# Patient Record
Sex: FEMALE | Race: BLACK OR AFRICAN AMERICAN | NOT HISPANIC OR LATINO | Employment: FULL TIME | URBAN - METROPOLITAN AREA
[De-identification: names, ages, dates, MRNs, and addresses within clinical notes are randomized per-mention and may not be internally consistent; named-entity substitution may affect disease eponyms.]

---

## 2022-01-11 PROBLEM — G89.29 CHRONIC PAIN OF BOTH KNEES: Chronic | Status: ACTIVE | Noted: 2022-01-11

## 2022-01-11 PROBLEM — I10 HYPERTENSION: Status: ACTIVE | Noted: 2019-06-03

## 2022-01-11 PROBLEM — M17.0 PRIMARY OSTEOARTHRITIS OF BOTH KNEES: Chronic | Status: ACTIVE | Noted: 2022-01-11

## 2022-01-11 PROBLEM — M25.561 CHRONIC PAIN OF BOTH KNEES: Chronic | Status: ACTIVE | Noted: 2022-01-11

## 2022-01-11 PROBLEM — Z20.822 CONTACT WITH AND (SUSPECTED) EXPOSURE TO COVID-19: Status: ACTIVE | Noted: 2021-09-15

## 2022-01-11 PROBLEM — M25.562 CHRONIC PAIN OF BOTH KNEES: Chronic | Status: ACTIVE | Noted: 2022-01-11

## 2022-01-11 PROBLEM — M19.90 ARTHRITIS: Status: ACTIVE | Noted: 2022-01-11

## 2022-01-12 PROBLEM — M19.90 ARTHRITIS: Status: RESOLVED | Noted: 2022-01-11 | Resolved: 2022-01-12

## 2022-01-12 PROBLEM — I10 HYPERTENSION, ESSENTIAL: Chronic | Status: ACTIVE | Noted: 2019-06-03

## 2022-01-12 PROBLEM — E66.01 OBESITY, CLASS III, BMI 40-49.9 (MORBID OBESITY): Chronic | Status: ACTIVE | Noted: 2022-01-12

## 2022-01-12 PROBLEM — Z20.822 CONTACT WITH AND (SUSPECTED) EXPOSURE TO COVID-19: Status: RESOLVED | Noted: 2021-09-15 | Resolved: 2022-01-12

## 2022-04-07 PROBLEM — R20.2 NUMBNESS AND TINGLING OF RIGHT ARM: Status: ACTIVE | Noted: 2022-04-07

## 2022-04-07 PROBLEM — F41.9 ANXIETY: Status: ACTIVE | Noted: 2022-04-07

## 2022-04-07 PROBLEM — R20.0 NUMBNESS AND TINGLING OF RIGHT ARM: Status: ACTIVE | Noted: 2022-04-07

## 2023-08-02 ENCOUNTER — HOSPITAL ENCOUNTER (EMERGENCY)
Facility: HOSPITAL | Age: 48
Discharge: HOME | End: 2023-08-02
Attending: EMERGENCY MEDICINE
Payer: COMMERCIAL

## 2023-08-02 VITALS
OXYGEN SATURATION: 98 % | HEART RATE: 62 BPM | BODY MASS INDEX: 47.56 KG/M2 | DIASTOLIC BLOOD PRESSURE: 67 MMHG | RESPIRATION RATE: 18 BRPM | WEIGHT: 293 LBS | SYSTOLIC BLOOD PRESSURE: 136 MMHG | TEMPERATURE: 98 F

## 2023-08-02 DIAGNOSIS — R10.9 FLANK PAIN: Primary | ICD-10-CM

## 2023-08-02 LAB
ALBUMIN SERPL BCP-MCNC: 3.3 G/DL (ref 3.5–5.2)
ALP SERPL-CCNC: 102 U/L (ref 55–135)
ALT SERPL W/O P-5'-P-CCNC: 13 U/L (ref 10–44)
ANION GAP SERPL CALC-SCNC: 9 MMOL/L (ref 8–16)
AST SERPL-CCNC: 13 U/L (ref 10–40)
B-HCG UR QL: NEGATIVE
BASOPHILS # BLD AUTO: 0.02 K/UL (ref 0–0.2)
BASOPHILS NFR BLD: 0.3 % (ref 0–1.9)
BILIRUB SERPL-MCNC: 0.2 MG/DL (ref 0.1–1)
BILIRUB UR QL STRIP: NEGATIVE
BUN SERPL-MCNC: 14 MG/DL (ref 6–20)
CALCIUM SERPL-MCNC: 8.7 MG/DL (ref 8.7–10.5)
CHLORIDE SERPL-SCNC: 106 MMOL/L (ref 95–110)
CLARITY UR: CLEAR
CO2 SERPL-SCNC: 25 MMOL/L (ref 23–29)
COLOR UR: YELLOW
CREAT SERPL-MCNC: 0.7 MG/DL (ref 0.5–1.4)
DIFFERENTIAL METHOD: ABNORMAL
EOSINOPHIL # BLD AUTO: 0.1 K/UL (ref 0–0.5)
EOSINOPHIL NFR BLD: 1.4 % (ref 0–8)
ERYTHROCYTE [DISTWIDTH] IN BLOOD BY AUTOMATED COUNT: 15.9 % (ref 11.5–14.5)
EST. GFR  (NO RACE VARIABLE): >60 ML/MIN/1.73 M^2
GLUCOSE SERPL-MCNC: 110 MG/DL (ref 70–110)
GLUCOSE UR QL STRIP: NEGATIVE
HCT VFR BLD AUTO: 28.6 % (ref 37–48.5)
HGB BLD-MCNC: 8.9 G/DL (ref 12–16)
HGB UR QL STRIP: ABNORMAL
IMM GRANULOCYTES # BLD AUTO: 0.04 K/UL (ref 0–0.04)
IMM GRANULOCYTES NFR BLD AUTO: 0.6 % (ref 0–0.5)
KETONES UR QL STRIP: NEGATIVE
LEUKOCYTE ESTERASE UR QL STRIP: NEGATIVE
LIPASE SERPL-CCNC: 30 U/L (ref 4–60)
LYMPHOCYTES # BLD AUTO: 2.3 K/UL (ref 1–4.8)
LYMPHOCYTES NFR BLD: 35.5 % (ref 18–48)
MCH RBC QN AUTO: 26.5 PG (ref 27–31)
MCHC RBC AUTO-ENTMCNC: 31.1 G/DL (ref 32–36)
MCV RBC AUTO: 85 FL (ref 82–98)
MONOCYTES # BLD AUTO: 0.5 K/UL (ref 0.3–1)
MONOCYTES NFR BLD: 7.4 % (ref 4–15)
NEUTROPHILS # BLD AUTO: 3.6 K/UL (ref 1.8–7.7)
NEUTROPHILS NFR BLD: 54.8 % (ref 38–73)
NITRITE UR QL STRIP: NEGATIVE
NRBC BLD-RTO: 0 /100 WBC
PH UR STRIP: 6 [PH] (ref 5–8)
PLATELET # BLD AUTO: 290 K/UL (ref 150–450)
PMV BLD AUTO: 9.6 FL (ref 9.2–12.9)
POTASSIUM SERPL-SCNC: 3.7 MMOL/L (ref 3.5–5.1)
PROT SERPL-MCNC: 7.3 G/DL (ref 6–8.4)
PROT UR QL STRIP: NEGATIVE
RBC # BLD AUTO: 3.36 M/UL (ref 4–5.4)
SODIUM SERPL-SCNC: 140 MMOL/L (ref 136–145)
SP GR UR STRIP: 1.02 (ref 1–1.03)
URN SPEC COLLECT METH UR: ABNORMAL
UROBILINOGEN UR STRIP-ACNC: NEGATIVE EU/DL
WBC # BLD AUTO: 6.59 K/UL (ref 3.9–12.7)

## 2023-08-02 PROCEDURE — 83690 ASSAY OF LIPASE: CPT | Performed by: EMERGENCY MEDICINE

## 2023-08-02 PROCEDURE — 80053 COMPREHEN METABOLIC PANEL: CPT | Performed by: EMERGENCY MEDICINE

## 2023-08-02 PROCEDURE — 25000003 PHARM REV CODE 250: Performed by: EMERGENCY MEDICINE

## 2023-08-02 PROCEDURE — 81003 URINALYSIS AUTO W/O SCOPE: CPT | Performed by: EMERGENCY MEDICINE

## 2023-08-02 PROCEDURE — 99285 EMERGENCY DEPT VISIT HI MDM: CPT | Mod: 25

## 2023-08-02 PROCEDURE — 96375 TX/PRO/DX INJ NEW DRUG ADDON: CPT

## 2023-08-02 PROCEDURE — 96376 TX/PRO/DX INJ SAME DRUG ADON: CPT

## 2023-08-02 PROCEDURE — 63600175 PHARM REV CODE 636 W HCPCS: Performed by: EMERGENCY MEDICINE

## 2023-08-02 PROCEDURE — 81025 URINE PREGNANCY TEST: CPT | Performed by: EMERGENCY MEDICINE

## 2023-08-02 PROCEDURE — 96361 HYDRATE IV INFUSION ADD-ON: CPT

## 2023-08-02 PROCEDURE — 96374 THER/PROPH/DIAG INJ IV PUSH: CPT

## 2023-08-02 PROCEDURE — 85025 COMPLETE CBC W/AUTO DIFF WBC: CPT | Performed by: EMERGENCY MEDICINE

## 2023-08-02 RX ORDER — NAPROXEN 375 MG/1
375 TABLET ORAL 2 TIMES DAILY WITH MEALS
Qty: 30 TABLET | Refills: 0 | Status: SHIPPED | OUTPATIENT
Start: 2023-08-02 | End: 2023-08-02 | Stop reason: SDUPTHER

## 2023-08-02 RX ORDER — ONDANSETRON 4 MG/1
4 TABLET, ORALLY DISINTEGRATING ORAL EVERY 6 HOURS PRN
Qty: 15 TABLET | Refills: 0 | Status: SHIPPED | OUTPATIENT
Start: 2023-08-02 | End: 2023-08-02 | Stop reason: SDUPTHER

## 2023-08-02 RX ORDER — NAPROXEN 375 MG/1
375 TABLET ORAL 2 TIMES DAILY WITH MEALS
Qty: 30 TABLET | Refills: 0 | Status: SHIPPED | OUTPATIENT
Start: 2023-08-02

## 2023-08-02 RX ORDER — MORPHINE SULFATE 4 MG/ML
4 INJECTION, SOLUTION INTRAMUSCULAR; INTRAVENOUS
Status: COMPLETED | OUTPATIENT
Start: 2023-08-02 | End: 2023-08-02

## 2023-08-02 RX ORDER — ONDANSETRON 4 MG/1
4 TABLET, ORALLY DISINTEGRATING ORAL EVERY 6 HOURS PRN
Qty: 15 TABLET | Refills: 0 | Status: SHIPPED | OUTPATIENT
Start: 2023-08-02

## 2023-08-02 RX ORDER — TRAMADOL HYDROCHLORIDE 50 MG/1
50 TABLET ORAL EVERY 6 HOURS PRN
Qty: 10 TABLET | Refills: 0 | Status: SHIPPED | OUTPATIENT
Start: 2023-08-02 | End: 2023-08-07

## 2023-08-02 RX ORDER — KETOROLAC TROMETHAMINE 30 MG/ML
15 INJECTION, SOLUTION INTRAMUSCULAR; INTRAVENOUS
Status: COMPLETED | OUTPATIENT
Start: 2023-08-02 | End: 2023-08-02

## 2023-08-02 RX ORDER — TRAMADOL HYDROCHLORIDE 50 MG/1
50 TABLET ORAL EVERY 6 HOURS PRN
Qty: 10 TABLET | Refills: 0 | Status: SHIPPED | OUTPATIENT
Start: 2023-08-02 | End: 2023-08-02 | Stop reason: SDUPTHER

## 2023-08-02 RX ORDER — ONDANSETRON 2 MG/ML
4 INJECTION INTRAMUSCULAR; INTRAVENOUS
Status: COMPLETED | OUTPATIENT
Start: 2023-08-02 | End: 2023-08-02

## 2023-08-02 RX ADMIN — SODIUM CHLORIDE 1000 ML: 9 INJECTION, SOLUTION INTRAVENOUS at 07:08

## 2023-08-02 RX ADMIN — MORPHINE SULFATE 4 MG: 4 INJECTION INTRAVENOUS at 07:08

## 2023-08-02 RX ADMIN — ONDANSETRON 4 MG: 2 INJECTION INTRAMUSCULAR; INTRAVENOUS at 07:08

## 2023-08-02 RX ADMIN — MORPHINE SULFATE 4 MG: 4 INJECTION INTRAVENOUS at 08:08

## 2023-08-02 RX ADMIN — KETOROLAC TROMETHAMINE 15 MG: 30 INJECTION, SOLUTION INTRAMUSCULAR; INTRAVENOUS at 10:08

## 2023-08-02 NOTE — Clinical Note
"Marcelina Matabrad" Brii was seen and treated in our emergency department on 8/2/2023.  She may return to work on 08/05/2023.       If you have any questions or concerns, please don't hesitate to call.      Chrissy Ortiz RN    "

## 2023-08-02 NOTE — ED PROVIDER NOTES
SCRIBE #1 NOTE: I, Nani Murphy, am scribing for, and in the presence of, Douglas Whitlock MD. I have scribed the entire note.       History     Chief Complaint   Patient presents with    Abdominal Pain     Bilat abd pain radiating to R side. Reports pains began after bowel movement. Also c/o nausea     Review of patient's allergies indicates:   Allergen Reactions    Citric acid Shortness Of Breath and Swelling    Iodine and iodide containing products Anaphylaxis and Swelling    Tomato (solanum lycopersicum) Shortness Of Breath and Swelling         History of Present Illness     HPI    8/2/2023, 7:04 AM  History obtained from the patient      History of Present Illness: Marcelina Teresa is a 47 y.o. female patient with a PMHx of HTN and obesity who presents to the Emergency Department for evaluation of RLQ abd pain which onset this AM. Symptoms are constant and moderate in severity. No mitigating or exacerbating factors reported. Associated sxs include n/v. Patient denies any dysuria, flank pain, diarrhea, blood in stool, constipation, and all other sxs at this time. No further complaints or concerns at this time.       Arrival mode: Personal vehicle      PCP: Primary Doctor No        Past Medical History:  Past Medical History:   Diagnosis Date    Allergic rhinitis 4/5/2013    Arthritis 1/11/2022    Backache 4/5/2013    Formatting of this note might be different from the original. dx update dx update    Contact with and (suspected) exposure to covid-19 9/15/2021    Dysuria 4/5/2013    Encounter for screening 4/5/2013    Formatting of this note might be different from the original. dx update dx update    Hypertension     Hypertension, essential 6/3/2019    Lower urinary tract infectious disease 4/5/2013    Formatting of this note might be different from the original. dx update dx update    Obesity, Class III, BMI 48.55 (morbid obesity) 1/12/2022    RT 2017 > LT 2021 Knee Osteoarthritis 01/11/2022    RT 2017  > LT 2021 Knee Pain 1/11/2022       Past Surgical History:  Past Surgical History:   Procedure Laterality Date    KIDNEY STONE SURGERY  2019    [facility] [place], [place]    TUBAL LIGATION Bilateral 2007    [facility]         Family History:  Family History   Problem Relation Age of Onset    Hypertension Mother     Kidney failure Father     Hypertension Father     Diabetes Father        Social History:  Social History     Tobacco Use    Smoking status: Never    Smokeless tobacco: Never   Substance and Sexual Activity    Alcohol use: Not Currently     Comment: Occasional mixed drink.    Drug use: Never    Sexual activity: Not Currently     Partners: Male        Review of Systems     Review of Systems   Constitutional:  Negative for fever.   HENT:  Negative for sore throat.    Respiratory:  Negative for shortness of breath.    Cardiovascular:  Negative for chest pain.   Gastrointestinal:  Positive for abdominal pain (RLQ), nausea and vomiting. Negative for blood in stool, constipation and diarrhea.   Genitourinary:  Negative for dysuria and flank pain.   Musculoskeletal:  Negative for back pain.   Skin:  Negative for rash.   Neurological:  Negative for weakness.   Hematological:  Does not bruise/bleed easily.   All other systems reviewed and are negative.       Physical Exam     Initial Vitals [08/02/23 0658]   BP Pulse Resp Temp SpO2   127/73 78 (!) 22 97.8 °F (36.6 °C) 98 %      MAP       --          Physical Exam  Nursing Notes and Vital Signs Reviewed.  Constitutional: Patient is in mild distress. Well-developed and well-nourished.  Head: Atraumatic. Normocephalic.  Eyes: PERRL. EOM intact. Conjunctivae are not pale. No scleral icterus.  ENT: Mucous membranes are moist. Oropharynx is clear and symmetric.    Neck: Supple. Full ROM. No lymphadenopathy.  Cardiovascular: Regular rate. Regular rhythm. No murmurs, rubs, or gallops. Distal pulses are 2+ and  symmetric.  Pulmonary/Chest: No respiratory distress. Clear to auscultation bilaterally. No wheezing or rales.  Abdominal: Soft and non-distended.  There is RLQ tenderness.  No rebound, guarding, or rigidity. Good bowel sounds.  Genitourinary: No CVA tenderness  Musculoskeletal: Moves all extremities. No obvious deformities. No edema. No calf tenderness.  Skin: Warm and dry.  Neurological:  Alert, awake, and appropriate.  Normal speech.  No acute focal neurological deficits are appreciated.  Psychiatric: Normal affect. Good eye contact. Appropriate in content.     ED Course   Procedures  ED Vital Signs:  Vitals:    08/02/23 0658 08/02/23 0733 08/02/23 0834 08/02/23 0835   BP: 127/73   139/70   Pulse: 78   60   Resp: (!) 22 20 18    Temp: 97.8 °F (36.6 °C)      TempSrc: Oral      SpO2: 98%   100%   Weight: (!) 137.8 kg (303 lb 10.9 oz)       08/02/23 0847   BP: (!) 143/75   Pulse: 61   Resp:    Temp:    TempSrc:    SpO2: 98%   Weight:        Abnormal Lab Results:  Labs Reviewed   CBC W/ AUTO DIFFERENTIAL - Abnormal; Notable for the following components:       Result Value    RBC 3.36 (*)     Hemoglobin 8.9 (*)     Hematocrit 28.6 (*)     MCH 26.5 (*)     MCHC 31.1 (*)     RDW 15.9 (*)     Immature Granulocytes 0.6 (*)     All other components within normal limits   COMPREHENSIVE METABOLIC PANEL - Abnormal; Notable for the following components:    Albumin 3.3 (*)     All other components within normal limits   URINALYSIS, REFLEX TO URINE CULTURE - Abnormal; Notable for the following components:    Occult Blood UA Trace (*)     All other components within normal limits    Narrative:     Specimen Source->Urine   PREGNANCY TEST, URINE RAPID   LIPASE        All Lab Results:  Results for orders placed or performed during the hospital encounter of 08/02/23   CBC Auto Differential   Result Value Ref Range    WBC 6.59 3.90 - 12.70 K/uL    RBC 3.36 (L) 4.00 - 5.40 M/uL    Hemoglobin 8.9 (L) 12.0 - 16.0 g/dL    Hematocrit 28.6  (L) 37.0 - 48.5 %    MCV 85 82 - 98 fL    MCH 26.5 (L) 27.0 - 31.0 pg    MCHC 31.1 (L) 32.0 - 36.0 g/dL    RDW 15.9 (H) 11.5 - 14.5 %    Platelets 290 150 - 450 K/uL    MPV 9.6 9.2 - 12.9 fL    Immature Granulocytes 0.6 (H) 0.0 - 0.5 %    Gran # (ANC) 3.6 1.8 - 7.7 K/uL    Immature Grans (Abs) 0.04 0.00 - 0.04 K/uL    Lymph # 2.3 1.0 - 4.8 K/uL    Mono # 0.5 0.3 - 1.0 K/uL    Eos # 0.1 0.0 - 0.5 K/uL    Baso # 0.02 0.00 - 0.20 K/uL    nRBC 0 0 /100 WBC    Gran % 54.8 38.0 - 73.0 %    Lymph % 35.5 18.0 - 48.0 %    Mono % 7.4 4.0 - 15.0 %    Eosinophil % 1.4 0.0 - 8.0 %    Basophil % 0.3 0.0 - 1.9 %    Differential Method Automated    Comprehensive Metabolic Panel   Result Value Ref Range    Sodium 140 136 - 145 mmol/L    Potassium 3.7 3.5 - 5.1 mmol/L    Chloride 106 95 - 110 mmol/L    CO2 25 23 - 29 mmol/L    Glucose 110 70 - 110 mg/dL    BUN 14 6 - 20 mg/dL    Creatinine 0.7 0.5 - 1.4 mg/dL    Calcium 8.7 8.7 - 10.5 mg/dL    Total Protein 7.3 6.0 - 8.4 g/dL    Albumin 3.3 (L) 3.5 - 5.2 g/dL    Total Bilirubin 0.2 0.1 - 1.0 mg/dL    Alkaline Phosphatase 102 55 - 135 U/L    AST 13 10 - 40 U/L    ALT 13 10 - 44 U/L    eGFR >60 >60 mL/min/1.73 m^2    Anion Gap 9 8 - 16 mmol/L   Urinalysis, Reflex to Urine Culture Urine, Clean Catch    Specimen: Urine   Result Value Ref Range    Specimen UA Urine, Clean Catch     Color, UA Yellow Yellow, Straw, Criss    Appearance, UA Clear Clear    pH, UA 6.0 5.0 - 8.0    Specific Gravity, UA 1.020 1.005 - 1.030    Protein, UA Negative Negative    Glucose, UA Negative Negative    Ketones, UA Negative Negative    Bilirubin (UA) Negative Negative    Occult Blood UA Trace (A) Negative    Nitrite, UA Negative Negative    Urobilinogen, UA Negative <2.0 EU/dL    Leukocytes, UA Negative Negative   Pregnancy, urine rapid   Result Value Ref Range    Preg Test, Ur Negative    Lipase   Result Value Ref Range    Lipase 30 4 - 60 U/L         Imaging Results:  Imaging Results              US Pelvis  Comp with Transvag NON-OB (xpd) (Final result)  Result time 08/02/23 10:52:48   Procedure changed from US Pelvis Complete Non OB     Final result by Burke Crawford MD (08/02/23 10:52:48)                   Impression:      The right ovary is not visualized.  The uterus and left ovary have a normal sonographic appearance.      Electronically signed by: Burke Crawford MD  Date:    08/02/2023  Time:    10:52               Narrative:    EXAMINATION:  US PELVIS COMP WITH TRANSVAG NON-OB (XPD)    CLINICAL HISTORY:  pelvic pain;    FINDINGS:  The uterus is normal in size and echotexture with a normal 9 mm endometrial stripe.  The uterus is retroflexed.  The right ovary was not visualized and the left ovary measures 3.9 x 1.8 x 2.8 cm. No free fluid identified. No solid adnexal lesions identified. Internal flow is demonstrated within both ovaries on color doppler.                                       CT Renal Stone Study ABD Pelvis WO (Final result)  Result time 08/02/23 09:37:31      Final result by Eric Cui III, MD (08/02/23 09:37:31)                   Impression:      Mild right-sided pelvicaliectasis.  No visualized obstructing calculus.    Diverticulosis without CT evidence of diverticulitis.    Mild free fluid within the posterior cul-de-sac which is slightly hyperdense suggesting possible hemorrhagic products.  If clinically indicated, consider with follow-up with pelvic/transvaginal ultrasound.      Electronically signed by: Ty Cui  Date:    08/02/2023  Time:    09:37               Narrative:    EXAMINATION:  CT RENAL STONE STUDY ABD PELVIS WO    CLINICAL HISTORY:  Flank pain, kidney stone suspected;    TECHNIQUE:  Low dose axial images, sagittal and coronal reformations were obtained from the lung bases to the pubic symphysis.  Contrast was not administered.    COMPARISON:  CT abdomen and pelvis 02/11/2019    FINDINGS:  Mild right-sided pelvicaliectasis.  No obstructive renal calculus.   Unchanged phleboliths within the pelvis.  The bladder is unremarkable.  The adrenal glands and left kidney appear unremarkable.    The liver, spleen, pancreas and gallbladder are normal.  No aortic aneurysm, free air or adenopathy.  The appendix is normal.  No bowel obstruction.  Diverticulosis within the transverse, descending and sigmoid colon without CT evidence of diverticulitis.    Physiologic free fluid within the pelvis.  The fluid is slightly hyperdense suggesting hemorrhagic products.  The uterus is retroverted.  The ovaries are grossly unremarkable.    Mild scarring or atelectasis within the right lower lobe.                                              The Emergency Provider reviewed the vital signs and test results, which are outlined above.     ED Discussion     10:59 AM: Reassessed pt at this time. Discussed with pt all pertinent ED information and results. Discussed pt dx and plan of tx. Gave pt all f/u and return to the ED instructions. All questions and concerns were addressed at this time. Pt expresses understanding of information and instructions, and is comfortable with plan to discharge. Pt is stable for discharge.    I discussed with patient and/or family/caretaker that evaluation in the ED does not suggest any emergent or life threatening medical conditions requiring immediate intervention beyond what was provided in the ED, and I believe patient is safe for discharge.  Regardless, an unremarkable evaluation in the ED does not preclude the development or presence of a serious of life threatening condition. As such, patient was instructed to return immediately for any worsening or change in current symptoms.       Medical Decision Making:   Initial Assessment:   Right flank pain since las night  Differential Diagnosis:   Kidney stone, flank pain. Ovarian cyst  Clinical Tests:   Lab Tests: Ordered and Reviewed  Radiological Study: Ordered and Reviewed  ED Management:  Labs and imaging reviewed  by me.  No acute findings except for signs of a recently passed stone.  Considered admission, but patient is feeling better, and ready to go home.             ED Medication(s):  Medications   sodium chloride 0.9% bolus 1,000 mL 1,000 mL (0 mLs Intravenous Stopped 8/2/23 0830)   morphine injection 4 mg (4 mg Intravenous Given 8/2/23 0733)   ondansetron injection 4 mg (4 mg Intravenous Given 8/2/23 0732)   morphine injection 4 mg (4 mg Intravenous Given 8/2/23 0834)   ketorolac injection 15 mg (15 mg Intravenous Given 8/2/23 1029)       New Prescriptions    NAPROXEN (NAPROSYN) 375 MG TABLET    Take 1 tablet (375 mg total) by mouth 2 (two) times daily with meals.    ONDANSETRON (ZOFRAN-ODT) 4 MG TBDL    Take 1 tablet (4 mg total) by mouth every 6 (six) hours as needed.    TRAMADOL (ULTRAM) 50 MG TABLET    Take 1 tablet (50 mg total) by mouth every 6 (six) hours as needed for Pain.        Follow-up Information       [facility] In 2 days.    Contact information:  [address] [facility] [address] [phone]                                 Scribe Attestation:   Scribe #1: I performed the above scribed service and the documentation accurately describes the services I performed. I attest to the accuracy of the note.     Attending:   Physician Attestation Statement for Scribe #1: I, Douglas Whitlock MD, personally performed the services described in this documentation, as scribed by Nani Murphy, in my presence, and it is both accurate and complete.           Clinical Impression       ICD-10-CM ICD-9-CM   1. Flank pain  R10.9 789.09       Disposition:   Disposition: Discharged  Condition: Stable         Douglas Whitlock MD  08/02/23 9260

## 2024-11-22 ENCOUNTER — HOSPITAL ENCOUNTER (EMERGENCY)
Facility: HOSPITAL | Age: 49
Discharge: HOME | End: 2024-11-22
Payer: COMMERCIAL

## 2024-11-22 VITALS
WEIGHT: 293 LBS | HEART RATE: 70 BPM | SYSTOLIC BLOOD PRESSURE: 175 MMHG | BODY MASS INDEX: 44.41 KG/M2 | OXYGEN SATURATION: 99 % | RESPIRATION RATE: 16 BRPM | DIASTOLIC BLOOD PRESSURE: 92 MMHG | TEMPERATURE: 98 F | HEIGHT: 68 IN

## 2024-11-22 DIAGNOSIS — S61.412A LACERATION OF LEFT HAND WITHOUT FOREIGN BODY, INITIAL ENCOUNTER: Primary | ICD-10-CM

## 2024-11-22 DIAGNOSIS — S60.222A HEMATOMA OF LEFT HAND: ICD-10-CM

## 2024-11-22 PROCEDURE — 90471 IMMUNIZATION ADMIN: CPT | Performed by: NURSE PRACTITIONER

## 2024-11-22 PROCEDURE — 63600175 PHARM REV CODE 636 W HCPCS: Performed by: NURSE PRACTITIONER

## 2024-11-22 PROCEDURE — 25000003 PHARM REV CODE 250: Performed by: NURSE PRACTITIONER

## 2024-11-22 PROCEDURE — 12002 RPR S/N/AX/GEN/TRNK2.6-7.5CM: CPT

## 2024-11-22 PROCEDURE — 99284 EMERGENCY DEPT VISIT MOD MDM: CPT | Mod: 25

## 2024-11-22 PROCEDURE — 90715 TDAP VACCINE 7 YRS/> IM: CPT | Performed by: NURSE PRACTITIONER

## 2024-11-22 RX ORDER — LIDOCAINE HYDROCHLORIDE 10 MG/ML
10 INJECTION, SOLUTION EPIDURAL; INFILTRATION; INTRACAUDAL; PERINEURAL
Status: COMPLETED | OUTPATIENT
Start: 2024-11-22 | End: 2024-11-22

## 2024-11-22 RX ORDER — DICLOFENAC SODIUM 75 MG/1
75 TABLET, DELAYED RELEASE ORAL 2 TIMES DAILY PRN
Qty: 14 TABLET | Refills: 0 | Status: SHIPPED | OUTPATIENT
Start: 2024-11-22 | End: 2024-11-29

## 2024-11-22 RX ORDER — HYDROCODONE BITARTRATE AND ACETAMINOPHEN 5; 325 MG/1; MG/1
1 TABLET ORAL
Status: COMPLETED | OUTPATIENT
Start: 2024-11-22 | End: 2024-11-22

## 2024-11-22 RX ORDER — ONDANSETRON 4 MG/1
4 TABLET, ORALLY DISINTEGRATING ORAL
Status: COMPLETED | OUTPATIENT
Start: 2024-11-22 | End: 2024-11-22

## 2024-11-22 RX ADMIN — ONDANSETRON 4 MG: 4 TABLET, ORALLY DISINTEGRATING ORAL at 07:11

## 2024-11-22 RX ADMIN — LIDOCAINE HYDROCHLORIDE 100 MG: 10 INJECTION, SOLUTION EPIDURAL; INFILTRATION; INTRACAUDAL at 06:11

## 2024-11-22 RX ADMIN — HYDROCODONE BITARTRATE AND ACETAMINOPHEN 1 TABLET: 5; 325 TABLET ORAL at 07:11

## 2024-11-22 RX ADMIN — TETANUS TOXOID, REDUCED DIPHTHERIA TOXOID AND ACELLULAR PERTUSSIS VACCINE, ADSORBED 0.5 ML: 5; 2.5; 8; 8; 2.5 SUSPENSION INTRAMUSCULAR at 06:11

## 2024-11-22 NOTE — ED PROVIDER NOTES
Encounter Date: 11/22/2024       History     Chief Complaint   Patient presents with    Hand Injury     Pt. Presents to ED due to accidentally stabbing her left hand with a knife while getting candle wax out of a container.      49-year-old female who presents to ER complaining of laceration to left hand from kitchen knife.  Injury occurred just prior to arrival.  Reports tetanus immunization is unknown.  Denies numbness, weakness, decreased range of motion.  Denies any other injuries.  Bleeding controlled with pressure.        Review of patient's allergies indicates:   Allergen Reactions    Citric acid Shortness Of Breath and Swelling    Iodine and iodide containing products Anaphylaxis and Swelling    Tomato (solanum lycopersicum) Shortness Of Breath and Swelling     Past Medical History:   Diagnosis Date    Allergic rhinitis 4/5/2013    Arthritis 1/11/2022    Backache 4/5/2013    Formatting of this note might be different from the original. dx update dx update    Contact with and (suspected) exposure to covid-19 9/15/2021    Dysuria 4/5/2013    Encounter for screening 4/5/2013    Formatting of this note might be different from the original. dx update dx update    Hypertension     Hypertension, essential 6/3/2019    Lower urinary tract infectious disease 4/5/2013    Formatting of this note might be different from the original. dx update dx update    Obesity, Class III, BMI 48.55 (morbid obesity) 1/12/2022    RT 2017 > LT 2021 Knee Osteoarthritis 01/11/2022    RT 2017 > LT 2021 Knee Pain 1/11/2022     Past Surgical History:   Procedure Laterality Date    KIDNEY STONE SURGERY  2019    [facility] [place], [place]    TUBAL LIGATION Bilateral 2007    [facility] [place], [place]     Family History   Problem Relation Name Age of Onset    Hypertension Mother      Kidney failure Father      Hypertension Father      Diabetes Father       Social History     Tobacco Use    Smoking status: Never     Smokeless tobacco: Never   Substance Use Topics    Alcohol use: Not Currently     Comment: Occasional mixed drink.    Drug use: Never     Review of Systems   Constitutional:  Negative for fever.   HENT:  Negative for sore throat.    Respiratory:  Negative for shortness of breath.    Cardiovascular:  Negative for chest pain.   Gastrointestinal:  Negative for nausea.   Genitourinary:  Negative for dysuria.   Musculoskeletal:  Negative for back pain.   Skin:  Negative for rash.        Positive laceration   Neurological:  Negative for weakness.   Hematological:  Does not bruise/bleed easily.       Physical Exam     Initial Vitals [11/22/24 1739]   BP Pulse Resp Temp SpO2   (!) 168/78 71 18 98.1 °F (36.7 °C) 97 %      MAP       --         Physical Exam    Nursing note and vitals reviewed.  Constitutional: She appears well-developed and well-nourished.   HENT:   Head: Normocephalic.   Eyes: Conjunctivae are normal.   Neck: Neck supple.   Cardiovascular:  Normal rate and regular rhythm.           Pulmonary/Chest: Breath sounds normal. No respiratory distress.   Abdominal: She exhibits no distension.   Musculoskeletal:         General: Normal range of motion.      Right hand: Normal.      Left hand: Swelling, laceration and tenderness present. Normal range of motion. Normal strength. Normal sensation. Normal capillary refill. Normal pulse.      Cervical back: Neck supple.      Comments: 2 cm laceration noted to dorsal aspect of left hand.  Bleeding controlled with pressure.  Hematoma noted.  Neurovascular exam intact.     Neurological: She is alert and oriented to person, place, and time.   Skin: Skin is warm and dry.   Psychiatric: She has a normal mood and affect.         ED Course   Lac Repair    Date/Time: 11/22/2024 7:06 PM    Performed by: Yulissa Rodriguez NP  Authorized by: Yulissa Rodriguez NP    Consent:     Consent obtained:  Verbal    Consent given by:  Patient    Risks discussed:  Infection, poor cosmetic  result and tendon damage    Alternatives discussed:  No treatment  Universal protocol:     Procedure explained and questions answered to patient or proxy's satisfaction: yes      Patient identity confirmed:  Verbally with patient  Anesthesia:     Anesthesia method:  Local infiltration    Local anesthetic:  Lidocaine 1% w/o epi  Laceration details:     Location:  Hand    Hand location:  L hand, dorsum    Length (cm):  3  Pre-procedure details:     Preparation:  Patient was prepped and draped in usual sterile fashion and imaging obtained to evaluate for foreign bodies  Exploration:     Hemostasis achieved with:  Direct pressure    Imaging obtained: x-ray      Imaging outcome: foreign body not noted      Wound extent: no foreign bodies/material noted and no tendon damage noted      Contaminated: no    Treatment:     Area cleansed with:  Chlorhexidine    Amount of cleaning:  Standard  Skin repair:     Repair method:  Sutures    Suture size:  4-0    Suture material:  Nylon    Suture technique:  Simple interrupted and figure eight  Approximation:     Approximation:  Close  Repair type:     Repair type:  Intermediate  Post-procedure details:     Dressing:  Bulky dressing    Procedure completion:  Tolerated well, no immediate complications    Labs Reviewed   HEPATITIS C ANTIBODY   HEP C VIRUS HOLD SPECIMEN          Imaging Results              X-Ray Hand 3 view Left (Final result)  Result time 11/22/24 18:39:07      Final result by Mikhail Childress MD (Timothy) (11/22/24 18:39:07)                   Impression:      Negative exam.      Electronically signed by: Mikhail Childress MD  Date:    11/22/2024  Time:    18:39               Narrative:    EXAMINATION:  XR HAND COMPLETE 3 VIEW LEFT    CLINICAL HISTORY:  Left hand pain and injury    TECHNIQUE:  Standard radiography performed.  Three views.    COMPARISON:  None    FINDINGS:  Bone density and architecture are normal.  No acute findings.                                        Medications   LIDOcaine (PF) 10 mg/ml (1%) injection 100 mg (100 mg Infiltration Given 11/22/24 1850)   Tdap (BOOSTRIX) vaccine injection 0.5 mL (0.5 mLs Intramuscular Given 11/22/24 1835)   HYDROcodone-acetaminophen 5-325 mg per tablet 1 tablet (1 tablet Oral Given 11/22/24 1939)   ondansetron disintegrating tablet 4 mg (4 mg Oral Given 11/22/24 1939)     Medical Decision Making                 7:31 PM  Patient comfortable.  Afebrile.  Nontoxic appearing.  Vital signs stable.  Presented to ER for laceration to left hand.  Laceration repaired and hemostasis achieved.  Patient tolerated well.  X-ray of hand without acute findings.  All results discussed with patient.  Advised to follow up with primary care provider or urgent care in 7 days for suture removal.  Advised to return to ER for new or worsening symptoms.  Patient verbalizes understanding and is in agreement with treatment plan.  Stable for discharge.  Differential diagnosis include fracture, retained foreign body, tendon damage, compartment syndrome.                   Clinical Impression:  Final diagnoses:  [S61.412A] Laceration of left hand without foreign body, initial encounter (Primary)  [S60.222A] Hematoma of left hand                 Yulissa Rodriguez NP  11/22/24 1945       Yulissa Rodriguez NP  11/22/24 1945       Yulissa Rodriguez NP  11/22/24 1946       Yulissa Rodriguez NP  11/22/24 1946

## 2024-12-01 ENCOUNTER — HOSPITAL ENCOUNTER (EMERGENCY)
Facility: HOSPITAL | Age: 49
Discharge: HOME | End: 2024-12-01
Attending: EMERGENCY MEDICINE
Payer: COMMERCIAL

## 2024-12-01 VITALS
TEMPERATURE: 98 F | RESPIRATION RATE: 17 BRPM | BODY MASS INDEX: 46.13 KG/M2 | DIASTOLIC BLOOD PRESSURE: 80 MMHG | SYSTOLIC BLOOD PRESSURE: 160 MMHG | WEIGHT: 293 LBS | HEART RATE: 86 BPM | OXYGEN SATURATION: 99 %

## 2024-12-01 DIAGNOSIS — Z48.02 VISIT FOR SUTURE REMOVAL: Primary | ICD-10-CM

## 2024-12-01 PROCEDURE — 99999 HC NO LEVEL OF SERVICE - ED ONLY: CPT

## 2024-12-01 NOTE — ED PROVIDER NOTES
Encounter Date: 12/1/2024       History     Chief Complaint   Patient presents with    Suture / Staple Removal     Presents for suture removal To L hand; pt reports no abnormal s/s otherwise      49 year old female presents today for suture removal over dorsum of her left hand  Denies any pain or drainage, fever, redness, warmth, decreased strength, N/T/W     The history is provided by the patient.     Review of patient's allergies indicates:   Allergen Reactions    Citric acid Shortness Of Breath and Swelling    Iodine and iodide containing products Anaphylaxis and Swelling    Tomato (solanum lycopersicum) Shortness Of Breath and Swelling     Past Medical History:   Diagnosis Date    Allergic rhinitis 4/5/2013    Arthritis 1/11/2022    Backache 4/5/2013    Formatting of this note might be different from the original. dx update dx update    Contact with and (suspected) exposure to covid-19 9/15/2021    Dysuria 4/5/2013    Encounter for screening 4/5/2013    Formatting of this note might be different from the original. dx update dx update    Hypertension     Hypertension, essential 6/3/2019    Lower urinary tract infectious disease 4/5/2013    Formatting of this note might be different from the original. dx update dx update    Obesity, Class III, BMI 48.55 (morbid obesity) 1/12/2022    RT 2017 > LT 2021 Knee Osteoarthritis 01/11/2022    RT 2017 > LT 2021 Knee Pain 1/11/2022     Past Surgical History:   Procedure Laterality Date    KIDNEY STONE SURGERY  2019    [facility] [place], [place]    TUBAL LIGATION Bilateral 2007    [facility] [place], [place]     Family History   Problem Relation Name Age of Onset    Hypertension Mother      Kidney failure Father      Hypertension Father      Diabetes Father       Social History     Tobacco Use    Smoking status: Never    Smokeless tobacco: Never   Substance Use Topics    Alcohol use: Not Currently     Comment: Occasional mixed drink.    Drug use: Never      Review of Systems   Constitutional:  Negative for fever.   HENT:  Negative for sore throat.    Respiratory:  Negative for shortness of breath.    Cardiovascular:  Negative for chest pain.   Gastrointestinal:  Negative for nausea.   Genitourinary:  Negative for dysuria.   Musculoskeletal:  Negative for back pain.   Skin:  Positive for wound (well healing). Negative for rash.   Neurological:  Negative for weakness.   Hematological:  Does not bruise/bleed easily.       Physical Exam     Initial Vitals [12/01/24 1513]   BP Pulse Resp Temp SpO2   (!) 160/80 86 18 98.1 °F (36.7 °C) 99 %      MAP       --         Physical Exam    Nursing note and vitals reviewed.  Constitutional: She appears well-developed and well-nourished.  Non-toxic appearance. She does not have a sickly appearance. She does not appear ill. No distress.   HENT:   Head: Normocephalic and atraumatic.   Right Ear: Hearing normal.   Left Ear: Hearing normal.   Nose: Nose normal. Mouth/Throat: Uvula is midline and oropharynx is clear and moist.   Eyes: Conjunctivae, EOM and lids are normal. Pupils are equal, round, and reactive to light.   Neck: Trachea normal. Neck supple.   Normal range of motion.   Full passive range of motion without pain.     Cardiovascular:  Normal rate, regular rhythm, normal heart sounds, intact distal pulses and normal pulses.           Pulmonary/Chest: Effort normal and breath sounds normal.   Abdominal: Abdomen is soft. Bowel sounds are normal. There is no abdominal tenderness. There is no rebound and no guarding.   Musculoskeletal:         General: Normal range of motion.      Cervical back: Normal, full passive range of motion without pain, normal range of motion and neck supple.     Neurological: She is alert and oriented to person, place, and time. She has normal strength and normal reflexes. No cranial nerve deficit or sensory deficit. GCS eye subscore is 4. GCS verbal subscore is 5. GCS motor subscore is 6.   Skin: Skin  is warm and dry. Capillary refill takes less than 2 seconds.   Well healed laceration over dorsum of left hand. Nontender. No warmth, redness, drainage. 1 Steri strip applied after removal of sutures.    Psychiatric: She has a normal mood and affect. Her behavior is normal. Thought content normal.         ED Course   Suture Removal    Date/Time: 12/1/2024 9:46 PM  Location procedure was performed: [facility] EMERGENCY DEPARTMENT    Performed by: Yannick Hazel PA-C  Authorized by: Lico Cabrera Jr., MD  Body area: upper extremity  Location details: left hand  Wound Appearance: clean, nontender, no drainage and nonpurulent  Sutures Removed: 3  Staples Removed: 0  Post-removal: Steri-Strips applied and dressing applied  Complications: No  Estimated blood loss (mL): 0  Specimens: No  Implants: No  Patient tolerance: Patient tolerated the procedure well with no immediate complications        Labs Reviewed - No data to display       Imaging Results    None          Medications - No data to display  Medical Decision Making  Tolerated well  1 steri strip applied  Dressing applied                                       Clinical Impression:  Final diagnoses:  [Z48.02] Visit for suture removal (Primary)          ED Disposition Condition    Discharge Stable          ED Prescriptions    None       Follow-up Information       Follow up With Specialties Details Why Contact Info    [facility] - Emergency Dept. Emergency Medicine  If symptoms worsen [id number] [facility] [id number] [phone]             Yannick Hazel PA-C  12/01/24 5242

## 2025-04-11 ENCOUNTER — HOSPITAL ENCOUNTER (EMERGENCY)
Facility: HOSPITAL | Age: 50
Discharge: HOME | End: 2025-04-11
Attending: EMERGENCY MEDICINE
Payer: COMMERCIAL

## 2025-04-11 VITALS
OXYGEN SATURATION: 100 % | BODY MASS INDEX: 44.29 KG/M2 | HEIGHT: 67 IN | RESPIRATION RATE: 16 BRPM | WEIGHT: 282.19 LBS | SYSTOLIC BLOOD PRESSURE: 168 MMHG | DIASTOLIC BLOOD PRESSURE: 80 MMHG | TEMPERATURE: 98 F | HEART RATE: 82 BPM

## 2025-04-11 DIAGNOSIS — R05.1 ACUTE COUGH: Primary | ICD-10-CM

## 2025-04-11 LAB
INFLUENZA A MOLECULAR (OHS): NEGATIVE
INFLUENZA B MOLECULAR (OHS): NEGATIVE
SARS-COV-2 RDRP RESP QL NAA+PROBE: NEGATIVE

## 2025-04-11 PROCEDURE — U0002 COVID-19 LAB TEST NON-CDC: HCPCS | Performed by: EMERGENCY MEDICINE

## 2025-04-11 PROCEDURE — 87502 INFLUENZA DNA AMP PROBE: CPT | Performed by: EMERGENCY MEDICINE

## 2025-04-11 PROCEDURE — 99284 EMERGENCY DEPT VISIT MOD MDM: CPT

## 2025-04-11 RX ORDER — FLUTICASONE PROPIONATE 50 MCG
1 SPRAY, SUSPENSION (ML) NASAL 2 TIMES DAILY PRN
Qty: 15 G | Refills: 0 | Status: SHIPPED | OUTPATIENT
Start: 2025-04-11

## 2025-04-11 RX ORDER — LEVOCETIRIZINE DIHYDROCHLORIDE 5 MG/1
5 TABLET, FILM COATED ORAL NIGHTLY
Qty: 30 TABLET | Refills: 0 | Status: SHIPPED | OUTPATIENT
Start: 2025-04-11 | End: 2026-04-11

## 2025-04-11 RX ORDER — PROMETHAZINE HYDROCHLORIDE AND DEXTROMETHORPHAN HYDROBROMIDE 6.25; 15 MG/5ML; MG/5ML
5 SYRUP ORAL EVERY 4 HOURS PRN
Qty: 150 ML | Refills: 0 | Status: SHIPPED | OUTPATIENT
Start: 2025-04-11 | End: 2025-04-21

## 2025-04-11 RX ORDER — ALBUTEROL SULFATE 90 UG/1
1-2 INHALANT RESPIRATORY (INHALATION) EVERY 6 HOURS PRN
Qty: 8 G | Refills: 2 | Status: SHIPPED | OUTPATIENT
Start: 2025-04-11 | End: 2026-04-11

## 2025-04-11 NOTE — Clinical Note
"Marcelina"Lucero Teresa was seen and treated in our emergency department on 4/11/2025.  She may return to work on 04/15/2025.  May return sooner if she is well enough.     If you have any questions or concerns, please don't hesitate to call.      mAbika Goodman PA-C"

## 2025-04-12 NOTE — ED PROVIDER NOTES
"Encounter Date: 4/11/2025       History     Chief Complaint   Patient presents with    Chest Congestion     Has been dealing w/ congestion/cold symptoms. States "had to use my inhaler today" had improvement after inhaler use.      49-year-old female presenting to the emergency department with complaints of nonproductive cough, chest congestion, nasal congestion, postnasal drip, and runny nose x1 week.  Patient reports that symptoms are improving, but got worse today.  She reports having an old inhaler from previous prescription that she use; improvement in symptoms after using her inhaler.  She has also been using OTC cough medication with little relief of cough.  Symptoms are constant moderate in severity.  No mitigating or exacerbating factors.  Unsure of any sick contacts.  Denies fever, chills, body aches, headaches, neck pain, sore throat, chest pain, shortness of breath, wheezing, abdominal pain, nausea, vomiting, and all other symptoms at this time.    The history is provided by the patient.     Review of patient's allergies indicates:   Allergen Reactions    Citric acid Shortness Of Breath and Swelling    Iodine Anaphylaxis, Rash and Swelling    Iodine and iodide containing products Anaphylaxis and Swelling    Tomato Shortness Of Breath and Swelling    Tomato (solanum lycopersicum) Shortness Of Breath and Swelling     Past Medical History:   Diagnosis Date    Allergic rhinitis 4/5/2013    Arthritis 1/11/2022    Backache 4/5/2013    Formatting of this note might be different from the original. dx update dx update    Contact with and (suspected) exposure to covid-19 9/15/2021    Dysuria 4/5/2013    Encounter for screening 4/5/2013    Formatting of this note might be different from the original. dx update dx update    Hypertension     Hypertension, essential 6/3/2019    Lower urinary tract infectious disease 4/5/2013    Formatting of this note might be different from the original. dx update dx update    " Obesity, Class III, BMI 48.55 (morbid obesity) 1/12/2022    RT 2017 > LT 2021 Knee Osteoarthritis 01/11/2022    RT 2017 > LT 2021 Knee Pain 1/11/2022     Past Surgical History:   Procedure Laterality Date    KIDNEY STONE SURGERY  2019    [facility] [place]    TUBAL LIGATION Bilateral 2007    [facility]     Family History   Problem Relation Name Age of Onset    Hypertension Mother      Kidney failure Father      Hypertension Father      Diabetes Father       Social History[1]  Review of Systems   Constitutional:  Negative for chills and fever.   HENT:  Positive for congestion, postnasal drip, rhinorrhea and sneezing. Negative for ear pain, sinus pressure, sinus pain and sore throat.    Respiratory:  Positive for cough and chest tightness. Negative for shortness of breath and wheezing.    Cardiovascular:  Negative for chest pain and palpitations.   Gastrointestinal:  Negative for abdominal pain, nausea and vomiting.   Genitourinary:  Negative for dysuria.   Musculoskeletal:  Negative for back pain, myalgias and neck pain.   Skin:  Negative for rash.   Neurological:  Negative for weakness and headaches.   Hematological:  Does not bruise/bleed easily.   All other systems reviewed and are negative.      Physical Exam     Initial Vitals [04/11/25 2017]   BP Pulse Resp Temp SpO2   (!) 176/84 80 16 98.2 °F (36.8 °C) 98 %      MAP       --         Physical Exam    Nursing note reviewed.  Constitutional: She appears well-developed and well-nourished.  Non-toxic appearance. She does not have a sickly appearance. She does not appear ill. No distress.   HENT:   Head: Normocephalic and atraumatic.   Right Ear: Hearing normal.   Left Ear: Hearing normal.   Nose: Nose normal. Mouth/Throat: Uvula is midline and oropharynx is clear and moist.   Eyes: Conjunctivae, EOM and lids are normal. Pupils are equal, round, and reactive to light.   Neck: Trachea normal. Neck supple.   Normal  range of motion.   Full passive range of motion without pain.     Cardiovascular:  Normal rate, regular rhythm, normal heart sounds, intact distal pulses and normal pulses.           Pulmonary/Chest: Effort normal and breath sounds normal. No respiratory distress. She has no wheezes. She has no rhonchi. She has no rales.   Abdominal: Abdomen is soft. Bowel sounds are normal. There is no abdominal tenderness. There is no rebound and no guarding.   Musculoskeletal:         General: Normal range of motion.      Cervical back: Normal, full passive range of motion without pain, normal range of motion and neck supple.     Lymphadenopathy:     She has no cervical adenopathy.   Neurological: She is alert and oriented to person, place, and time. She has normal strength and normal reflexes. No cranial nerve deficit or sensory deficit. GCS score is 15. GCS eye subscore is 4. GCS verbal subscore is 5. GCS motor subscore is 6.   Skin: Skin is warm and dry. No rash noted.   Psychiatric: She has a normal mood and affect. Her behavior is normal. Thought content normal.         ED Course   Procedures  Labs Reviewed   INFLUENZA A & B BY MOLECULAR - Normal       Result Value    INFLUENZA A MOLECULAR Negative      INFLUENZA B MOLECULAR  Negative     SARS-COV-2 RNA AMPLIFICATION, QUAL - Normal    SARS COV-2 Molecular Negative            Imaging Results    None          Medications - No data to display  Medical Decision Making  Differential diagnosis to include but not limited to:  Seasonal allergies, bronchitis, viral syndrome.    Risk  Prescription drug management.  Risk Details: Discussed with patient several reasons for coughing (i.e., sinusitis, asthma, COPD, GERD, exposure to smoke or allergens, and infection). I provided tips to the patient to help ease coughing episodes such as: sucking on cough drops or hard candy; using a vaporizer or taking a steamy shower to increase the moisture in the air and help soothe a dry throat;  drinking plenty of fluids; and speaking with pharmacist or healthcare team before taking OTC medications due to side effects and possible medication incompatibilities.      I discussed with patient that evaluation in the ED does not suggest any emergent or life threatening medical conditions requiring immediate intervention beyond what was provided in the ED, and I believe patient is safe for discharge. Regardless, an unremarkable evaluation in the ED does not preclude the development or presence of a serious of life threatening condition. As such, patient was instructed to return immediately for any worsening or change in current symptoms.                                       Clinical Impression:  Final diagnoses:  [R05.1] Acute cough (Primary)          ED Disposition Condition    Discharge Stable          ED Prescriptions       Medication Sig Dispense Start Date End Date Auth. Provider    levocetirizine (XYZAL) 5 MG tablet Take 1 tablet (5 mg total) by mouth every evening. 30 tablet 4/11/2025 4/11/2026 Ambika Goodman PA-C    fluticasone propionate (FLONASE) 50 mcg/actuation nasal spray 1 spray (50 mcg total) by Each Nostril route 2 (two) times daily as needed for Rhinitis. 15 g 4/11/2025 -- Ambika Goodman PA-C    albuterol (PROVENTIL/VENTOLIN HFA) 90 mcg/actuation inhaler Inhale 1-2 puffs into the lungs every 6 (six) hours as needed for Shortness of Breath. Rescue 8 g 4/11/2025 4/11/2026 Ambika Goodman PA-C    promethazine-dextromethorphan (PROMETHAZINE-DM) 6.25-15 mg/5 mL Syrp Take 5 mLs by mouth every 4 (four) hours as needed (cough). 150 mL 4/11/2025 4/21/2025 Ambika Goodman PA-C          Follow-up Information       Follow up With Specialties Details Why Contact Info    [facility] - Emergency Dept. Emergency Medicine  If symptoms worsen [address] [facility] [address] [phone]    Follow up with your PCP of choice in the next 1-2 days.                   [1]   Social  History  Tobacco Use    Smoking status: Never    Smokeless tobacco: Never   Substance Use Topics    Alcohol use: Not Currently     Comment: Occasional mixed drink.    Drug use: Never        Ambika Goodman PA-C  04/12/25 0103